# Patient Record
Sex: MALE | Race: ASIAN | URBAN - METROPOLITAN AREA
[De-identification: names, ages, dates, MRNs, and addresses within clinical notes are randomized per-mention and may not be internally consistent; named-entity substitution may affect disease eponyms.]

---

## 2017-01-04 ENCOUNTER — MEDICAL CORRESPONDENCE (OUTPATIENT)
Dept: HEALTH INFORMATION MANAGEMENT | Facility: CLINIC | Age: 32
End: 2017-01-04

## 2017-01-09 ENCOUNTER — MEDICAL CORRESPONDENCE (OUTPATIENT)
Dept: HEALTH INFORMATION MANAGEMENT | Facility: CLINIC | Age: 32
End: 2017-01-09

## 2017-01-10 ENCOUNTER — OFFICE VISIT (OUTPATIENT)
Dept: DERMATOLOGY | Facility: CLINIC | Age: 32
End: 2017-01-10

## 2017-01-10 DIAGNOSIS — L60.3 ONYCHODYSTROPHY: Primary | ICD-10-CM

## 2017-01-10 RX ORDER — CLOBETASOL PROPIONATE 0.5 MG/G
OINTMENT TOPICAL 2 TIMES DAILY
Qty: 30 G | Refills: 1 | Status: SHIPPED | OUTPATIENT
Start: 2017-01-10

## 2017-01-10 ASSESSMENT — PAIN SCALES - GENERAL: PAINLEVEL: NO PAIN (0)

## 2017-01-10 NOTE — PROGRESS NOTES
DERMATOLOGY PROBLEM LIST:  Onychodystrophy of the fingernails without a known history of primary dermatosis:  Sending clipping to exclude onychomycosis and initiating an empiric trial of clobetasol 0.05% ointment.      HISTORY OF PRESENT ILLNESS:  Damien is a pleasant, 31-year-old  gentleman presenting to Dermatology Clinic as a new patient on referral from Dr. Pappas for evaluation and management of multiple fingernail onychodystrophy.  The patient was previously seen in August, and his nail condition was evaluated with a clipping for fungal culture, which resulted in no growth.  The patient reports a roughly 6 month history of nail pit thickening, distal splitting and discoloration.  He denies any prior involvement with inflammation of the proximal nail folds or the lateral nail fold and has no history of hand dermatitis.  He denies a history compatible with atopic dermatitis or psoriasis as well as lichen planus.  He denies a history of alopecic patches on his scalp.  He has not attempted any additional treatments to the sites.  He has been taking a biotin supplement daily without significant benefits.  The patient was born and grew up in [place], but recently moved to the [facility] to pursue a human resources degree at the [employer].  He reports onset of this condition soon after arriving in the [place] and worries that there may be an internal trigger involved.      REVIEW OF SYSTEMS:  Otherwise well, in baseline state of health.  No further skin complaints.      MEDICATIONS:  None.      ALLERGIES:  No known drug allergies.      PAST MEDICAL, FAMILY AND SOCIAL HISTORY:    History reviewed. No pertinent past medical history.     Family History   Problem Relation Age of Onset     Melanoma No family hx of      Skin Cancer No family hx of      Social History   Substance Use Topics     Smoking status: Never Smoker      Smokeless tobacco: Never Used     Alcohol Use: Not on file      PHYSICAL  EXAMINATION:   GENERAL:  A well-appearing, 31-year-old  gentleman in no acute distress.  Pleasant and cooperative with exam, appearing stated age.   SKIN:  Carrera type III.  Examination of the face, scalp, neck and bilateral upper extremities was performed and notable for the following significant findings (a more thoroughgoing skin exam declined at this time):  Nine fingernails are notable for onychosis and streaky, red to purple to brown streaks beginning outside of the proximal nail folds and distributed randomly throughout the nail plates.  There are multiple pits.  The proximal nail folds are noninflamed.  Mild subungual debris is present under 3 fingernails.  The remainder of the exam is within normal limits.      ASSESSMENT AND PLAN:  Onychodystrophy of multiple fingernails.  We discussed the differential diagnosis of multiple dystrophic fingernails, including atopic dermatitis, psoriasis, lichen planus, alopecia areata and others.  A clipping was obtained for PAS examination to directly visualize the presence or lack thereof of fungal elements; I suspect this will show only blood.  He denies trauma to the areas and has no hobbies which would result in significant damage to his nails, nail folds or matrices.  For initial conservative management, I recommended clobetasol 0.05% ointment for use twice daily in a thin layer to the proximal nail folds, but we began discussing systemic treatments, such as methotrexate and acitretin, should he decide that his involvement is severe enough to desire systemic treatments with potential side effects.  He will consider this possibility over the next 2 months.      Return to clinic in 2 months.     Jens James MD  Dermatology Staff Physician  , Department of Dermatology

## 2017-01-10 NOTE — MR AVS SNAPSHOT
After Visit Summary   1/10/2017    Damien Gonzalez    MRN: [id number]           Patient Information     Date Of Birth          1985        Visit Information        Provider Department      1/10/2017 9:15 AM Jens James MD [facility] Dermatology        Today's Diagnoses     Onychodystrophy    -  1        Follow-ups after your visit        Your next 10 appointments already scheduled     Mar 14, 2017  9:30 AM   (Arrive by 9:15 AM)   Return Visit with Jens James MD   [facility] Dermatology ([facility])    [address] [place] [id number] [phone]              Who to contact     Please call your clinic at [phone] to:    Ask questions about your health    Make or cancel appointments    Discuss your medicines    Learn about your test results    Speak to your doctor   If you have compliments or concerns about an experience at your clinic, or if you wish to file a complaint, please contact [facility] Patient Relations at [phone] or email us at [email or web address]         Additional Information About Your Visit        Care EveryWhere ID     This is your Care EveryWhere ID. This could be used by other organizations to access your [place] medical records  [id number]         Blood Pressure from Last 3 Encounters:   No data found for BP    Weight from Last 3 Encounters:   No data found for Wt              We Performed the Following     Surgical pathology exam          Today's Medication Changes          These changes are accurate as of: 1/10/17  9:50 AM.  If you have any questions, ask your nurse or doctor.               Start taking these medicines.        Dose/Directions    clobetasol 0.05 % ointment   Commonly known as:  TEMOVATE   Used for:  Onychodystrophy   Started by:  Jens James MD        Apply topically 2 times daily To proximal nail folds (base of nails) in a thin layer BID.    Quantity:  30 g   Refills:  1            Where to get your medicines      These medications were sent to [facility] [place], [place] - [address]  [address], [facility] [address]    Hours:  TRANSPLANT PHONE NUMBER [phone] Phone:  [phone]    - clobetasol 0.05 % ointment             Primary Care Provider    None Specified       No primary provider on file.        Thank you!     Thank you for choosing [facility] DERMATOLOGY  for your care. Our goal is always to provide you with excellent care. Hearing back from our patients is one way we can continue to improve our services. Please take a few minutes to complete the written survey that you may receive in the mail after your visit with us. Thank you!             Your Updated Medication List - Protect others around you: Learn how to safely use, store and throw away your medicines at www.disposemymeds.org.          This list is accurate as of: 1/10/17  9:50 AM.  Always use your most recent med list.                   Brand Name Dispense Instructions for use    clobetasol 0.05 % ointment    TEMOVATE    30 g    Apply topically 2 times daily To proximal nail folds (base of nails) in a thin layer BID.

## 2017-01-10 NOTE — NURSING NOTE
Dermatology Rooming Note    Damien Gonzalez's goals for this visit include:   Chief Complaint   Patient presents with     Derm Problem     Onychodystrophy - finger nails. Referred by Delfina Sarah, CMA

## 2017-01-10 NOTE — Clinical Note
1/10/2017       RE: Damien Gonzalez  [address]  [address]  [place] [facility] [address]     Dear Colleague,    Thank you for referring your patient, Damien Gonzalez, to the [facility] DERMATOLOGY at [facility]. Please see a copy of my visit note below.    DERMATOLOGY PROBLEM LIST:  Onychodystrophy of the fingernails without a known history of primary dermatosis:  Sending clipping to exclude onychomycosis and initiating an empiric trial of clobetasol 0.05% ointment.      HISTORY OF PRESENT ILLNESS:  Damien is a pleasant, 31-year-old  gentleman presenting to Dermatology Clinic as a new patient on referral from Dr. Pappas for evaluation and management of multiple fingernail onychodystrophy.  The patient was previously seen in August, and his nail condition was evaluated with a clipping for fungal culture, which resulted in no growth.  The patient reports a roughly 6 month history of nail pit thickening, distal splitting and discoloration.  He denies any prior involvement with inflammation of the proximal nail folds or the lateral nail fold and has no history of hand dermatitis.  He denies a history compatible with atopic dermatitis or psoriasis as well as lichen planus.  He denies a history of alopecic patches on his scalp.  He has not attempted any additional treatments to the sites.  He has been taking a biotin supplement daily without significant benefits.  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  He reports onset of this condition soon after arriving in the [place] and worries that there may be an internal trigger involved.      REVIEW OF SYSTEMS:  Otherwise well, in baseline state of health.  No further skin complaints.      MEDICATIONS:  None.      ALLERGIES:  No known drug allergies.      PAST MEDICAL, FAMILY AND SOCIAL HISTORY:    History reviewed. No pertinent past medical  history.     Family History   Problem Relation Age of Onset     Melanoma No family hx of      Skin Cancer No family hx of      Social History   Substance Use Topics     Smoking status: Never Smoker      Smokeless tobacco: Never Used     Alcohol Use: Not on file      PHYSICAL EXAMINATION:   GENERAL:  A well-appearing, 31-year-old  gentleman in no acute distress.  Pleasant and cooperative with exam, appearing stated age.   SKIN:  Carrera type III.  Examination of the face, scalp, neck and bilateral upper extremities was performed and notable for the following significant findings (a more thoroughgoing skin exam declined at this time):  Nine fingernails are notable for onychosis and streaky, red to purple to brown streaks beginning outside of the proximal nail folds and distributed randomly throughout the nail plates.  There are multiple pits.  The proximal nail folds are noninflamed.  Mild subungual debris is present under 3 fingernails.  The remainder of the exam is within normal limits.      ASSESSMENT AND PLAN:  Onychodystrophy of multiple fingernails.  We discussed the differential diagnosis of multiple dystrophic fingernails, including atopic dermatitis, psoriasis, lichen planus, alopecia areata and others.  A clipping was obtained for PAS examination to directly visualize the presence or lack thereof of fungal elements; I suspect this will show only blood.  He denies trauma to the areas and has no hobbies which would result in significant damage to his nails, nail folds or matrices.  For initial conservative management, I recommended clobetasol 0.05% ointment for use twice daily in a thin layer to the proximal nail folds, but we began discussing systemic treatments, such as methotrexate and acitretin, should he decide that his involvement is severe enough to desire systemic treatments with potential side effects.  He will consider this possibility over the next 2 months.      Return to clinic in 2  months.     Jens James MD  Dermatology Staff Physician  , Department of Dermatology

## 2017-01-12 LAB — COPATH REPORT: NORMAL

## 2017-03-14 ENCOUNTER — OFFICE VISIT (OUTPATIENT)
Dept: DERMATOLOGY | Facility: CLINIC | Age: 32
End: 2017-03-14

## 2017-03-14 DIAGNOSIS — L60.3 ONYCHODYSTROPHY: Primary | ICD-10-CM

## 2017-03-14 ASSESSMENT — PAIN SCALES - GENERAL: PAINLEVEL: NO PAIN (0)

## 2017-03-14 NOTE — LETTER
Date:April 7, 2017      Patient was self referred, no letter generated. Do not send.        [facility] Health Information

## 2017-03-14 NOTE — LETTER
"3/14/2017       RE: Damien Gonzalez  [address]  [address]  [facility] [place] [facility] [address]     Dear Colleague,    Thank you for referring your patient, Damien Gonzalez, to the [facility] DERMATOLOGY at [facility]. Please see a copy of my visit note below.    [facility] Dermatology Note      Dermatology Problem List:  1. Onychodystrophy of the fingernails without a known history of primary dermatosis:  Clipping sent for PAS, which was negative, but showed inflammatory cells within nail. Empiric trial of clobetasol 0.05% ointment helpful.     Encounter Date: Mar 14, 2017    CC:   Chief Complaint   Patient presents with     Derm Problem     Damien is here for an \"exam of the fingernails.\" He feels they're slowly improving .         History of Present Illness:  Mr. Damien Gonzalez is a 31 year old male who presents as a follow-up for nail changes. The patient was last seen 1/10/17 when he was instructed to use clobetasol ointment once daily to the nail folds.  He has done this with some improvement in the proximal nails.  He notes that he has taken week long breaks when he had itching of the skin at the proximal nail fold. He denies any skin rashes or changes.      Denies any family history of lichen planus or psoriasis.      Past Medical History:   There is no problem list on file for this patient.    History reviewed. No pertinent past medical history.  History reviewed. No pertinent past surgical history.    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  Originally from [place].      Family History:  Negative for lichen planus and psoriasis.    Medications:  Current Outpatient Prescriptions   Medication Sig Dispense Refill     clobetasol (TEMOVATE) 0.05 % ointment Apply topically 2 times daily To proximal nail folds (base of nails) in a thin layer BID. 30 g 1        No Known Allergies    Review of Systems:  -Constitutional:  Feeling well, in usual state of " health.  -Skin:  As per HPI, no additional concerns.    Physical exam:  Vitals: There were no vitals taken for this visit.  GEN: This is a well developed, well-nourished male in no acute distress, in a pleasant mood.    SKIN: Focused examination of the face, neck, and bilateral hands was performed.  -On the bilateral first digits, the nails have longitudinal ridges with brown streaks on the alteral aspects consistent with hemosiderin.  This does not involve the proximal 5mm of the nail.  There is pitting throughout the nails but the proximal 2mm appears improved from the distal nails.  -Pitting on remaining nails throughout the entire nail.  -No dermatitis appreciated at proximal nail folds.   -No other lesions of concern on areas examined.     Impression/Plan:  1. Onychodystrophy/20-nail dystrophy: Patient denies any skin rashes today.  Differential diagnosis of multiple dystrophic fingernails, including atopic dermatitis, psoriasis, lichen planus, alopecia areata, among others. There has been subtle improvement with trial of clobetasol 0.05% ointment on the first finger bilaterally whereas the remaining nails remain about the same. Discussed that clobetasol likely will not clear nails completely, but is proving to be helpful so far. Will continue at this time with application to the proximal nail fold only and reevaluate in 6 months (offered 3 months, but patient will be in [place] at that time). Discussed signs of atrophy in detail that would require a 2 week break in therapy.  Asked patient to have pharmacy notify us if he needs refills.  - can consider a systemic such as acitretin if desired.    Follow-up in 6 months, earlier for new or changing lesions.     Dr. Jens James staffed the patient.    Staff Involved:  Resident(Crystal Carney)/Staff(as above)    Staff Physician Comments:   I saw and evaluated the patient with the resident and I agree with the assessment and plan.  I was present for the  examination.    Jens James MD  Dermatology Staff Physician  , Department of Dermatology      Again, thank you for allowing me to participate in the care of your patient.      Sincerely,    Jens James MD

## 2017-03-14 NOTE — MR AVS SNAPSHOT
After Visit Summary   3/14/2017    Damien Gonzalez    MRN: [id number]           Patient Information     Date Of Birth          1985        Visit Information        Provider Department      3/14/2017 9:30 AM Jens James MD [facility] Dermatology        Today's Diagnoses     Onychodystrophy    -  1       Follow-ups after your visit        Follow-up notes from your care team     Return in about 6 months (around 9/14/2017).      Who to contact     Please call your clinic at [phone] to:    Ask questions about your health    Make or cancel appointments    Discuss your medicines    Learn about your test results    Speak to your doctor   If you have compliments or concerns about an experience at your clinic, or if you wish to file a complaint, please contact [facility] Patient Relations at [phone] or email us at [email or web address]         Additional Information About Your Visit        Care EveryWhere ID     This is your Care EveryWhere ID. This could be used by other organizations to access your [place] medical records  [id number]         Blood Pressure from Last 3 Encounters:   No data found for BP    Weight from Last 3 Encounters:   No data found for Wt              Today, you had the following     No orders found for display       Primary Care Provider    None Specified       No primary provider on file.        Thank you!     Thank you for choosing [facility] DERMATOLOGY  for your care. Our goal is always to provide you with excellent care. Hearing back from our patients is one way we can continue to improve our services. Please take a few minutes to complete the written survey that you may receive in the mail after your visit with us. Thank you!             Your Updated Medication List - Protect others around you: Learn how to safely use, store and throw away your medicines at www.disposemymeds.org.          This list is accurate as of: 3/14/17 11:59  PM.  Always use your most recent med list.                   Brand Name Dispense Instructions for use    clobetasol 0.05 % ointment    TEMOVATE    30 g    Apply topically 2 times daily To proximal nail folds (base of nails) in a thin layer BID.

## 2017-03-14 NOTE — PROGRESS NOTES
"[facility] Dermatology Note      Dermatology Problem List:  1. Onychodystrophy of the fingernails without a known history of primary dermatosis:  Clipping sent for PAS, which was negative, but showed inflammatory cells within nail. Empiric trial of clobetasol 0.05% ointment helpful.     Encounter Date: Mar 14, 2017    CC:   Chief Complaint   Patient presents with     Derm Problem     Damien is here for an \"exam of the fingernails.\" He feels they're slowly improving .         History of Present Illness:  Mr. Damien Gonzalez is a 31 year old male who presents as a follow-up for nail changes. The patient was last seen 1/10/17 when he was instructed to use clobetasol ointment once daily to the nail folds.  He has done this with some improvement in the proximal nails.  He notes that he has taken week long breaks when he had itching of the skin at the proximal nail fold. He denies any skin rashes or changes.      Denies any family history of lichen planus or psoriasis.      Past Medical History:   There is no problem list on file for this patient.    History reviewed. No pertinent past medical history.  History reviewed. No pertinent past surgical history.    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  Originally from [place].      Family History:  Negative for lichen planus and psoriasis.    Medications:  Current Outpatient Prescriptions   Medication Sig Dispense Refill     clobetasol (TEMOVATE) 0.05 % ointment Apply topically 2 times daily To proximal nail folds (base of nails) in a thin layer BID. 30 g 1        No Known Allergies    Review of Systems:  -Constitutional:  Feeling well, in usual state of health.  -Skin:  As per HPI, no additional concerns.    Physical exam:  Vitals: There were no vitals taken for this visit.  GEN: This is a well developed, well-nourished male in no acute distress, in a pleasant mood.    SKIN: Focused examination of the face, neck, and bilateral " hands was performed.  -On the bilateral first digits, the nails have longitudinal ridges with brown streaks on the alteral aspects consistent with hemosiderin.  This does not involve the proximal 5mm of the nail.  There is pitting throughout the nails but the proximal 2mm appears improved from the distal nails.  -Pitting on remaining nails throughout the entire nail.  -No dermatitis appreciated at proximal nail folds.   -No other lesions of concern on areas examined.     Impression/Plan:  1. Onychodystrophy/20-nail dystrophy: Patient denies any skin rashes today.  Differential diagnosis of multiple dystrophic fingernails, including atopic dermatitis, psoriasis, lichen planus, alopecia areata, among others. There has been subtle improvement with trial of clobetasol 0.05% ointment on the first finger bilaterally whereas the remaining nails remain about the same. Discussed that clobetasol likely will not clear nails completely, but is proving to be helpful so far. Will continue at this time with application to the proximal nail fold only and reevaluate in 6 months (offered 3 months, but patient will be in [place] at that time). Discussed signs of atrophy in detail that would require a 2 week break in therapy.  Asked patient to have pharmacy notify us if he needs refills.  - can consider a systemic such as acitretin if desired.    Follow-up in 6 months, earlier for new or changing lesions.     Dr. Jens James staffed the patient.    Staff Involved:  Resident(Crystal Carney)/Staff(as above)    Staff Physician Comments:   I saw and evaluated the patient with the resident and I agree with the assessment and plan.  I was present for the examination.    Jens James MD  Dermatology Staff Physician  , Department of Dermatology

## 2017-03-14 NOTE — NURSING NOTE
"Dermatology Rooming Note    Damien Gonzalez's goals for this visit include:   Chief Complaint   Patient presents with     Derm Problem     Min is here for an \"exam of the fingernails.\" He feels they're slowly improving .         Tata Wells, GEREMIAS    "